# Patient Record
Sex: FEMALE | Race: WHITE
[De-identification: names, ages, dates, MRNs, and addresses within clinical notes are randomized per-mention and may not be internally consistent; named-entity substitution may affect disease eponyms.]

---

## 2020-11-06 ENCOUNTER — HOSPITAL ENCOUNTER (OUTPATIENT)
Dept: HOSPITAL 61 - ECHO | Age: 40
End: 2020-11-06
Attending: INTERNAL MEDICINE
Payer: COMMERCIAL

## 2020-11-06 DIAGNOSIS — I34.0: Primary | ICD-10-CM

## 2020-11-06 PROCEDURE — 93306 TTE W/DOPPLER COMPLETE: CPT

## 2020-11-06 NOTE — CARD
MR#: [id number]

Account#: [id number]

Accession#: [id number]

Date of Study: 11/06/2020

Ordering Physician: ARETHA AUGUSTIN, 

Referring Physician: ARETHA AUGUSTIN, 

Tech: Brandie Clark [facility]





--------------- APPROVED REPORT --------------





EXAM: Two-dimensional and M-mode echocardiogram with Doppler and color Doppler.



Other Information 

Quality : Good



INDICATION

Dyspnea 



2D DIMENSIONS 

RVDd2.5 (2.9-3.5cm)Left Atrium(2D)3.4 (1.6-4.0cm)

IVSd0.8 (0.7-1.1cm)Aortic Root(2D)2.1 (2.0-3.7cm)

LVDd5.2 (3.9-5.9cm)LVOT Diameter2.0 (1.8-2.4cm)

PWd0.9 (0.7-1.1cm)LVDs3.8 (2.5-4.0cm)

FS (%) 27.3 %SV69.0 ml



Aortic Valve

AoV Peak Gene.133.8cm/sAoV VTI26.4cm

AO Peak GR.7.2mmHgLVOT Peak Gene.113.7cm/s

LVOT  VTI 19.98cmAO Mean GR.4mmHg

KAYDEN (VMAX)2.36rs7LSH   (VTI)2.45cm2



Mitral Valve

MV E Cxqpygox61.8cm/sMV DECEL [id number]

MV A Gqqtydxy92.9cm/sMV [id number]

E/A  Ratio1.6MVA (PHT)3.98cm2



TDI

E/Lateral E'18.8E/Medial E'9.1



Pulmonary Vein

S1 Tesyghix45.1cm/sD2 Atlzqdjo19.3cm/s



 LEFT VENTRICLE 

The left ventricle is normal size. There is normal left ventricular wall thickness. Left ventricle sy
stolic function is low normal. The Ejection Fraction is 50-55%. There is normal LV segmental wall mot
ion. The left ventricular diastolic function and filling is normal for age.



 RIGHT VENTRICLE 

The right ventricle is normal size. The right ventricular systolic function is normal.



 ATRIA 

The left atrium size is normal. The right atrium size is normal. The interatrial septum is intact wit
h no evidence for an atrial septal defect or patent foramen ovale as noted on 2-D or Doppler imaging.




 AORTIC VALVE 

The aortic valve is normal in structure and function. Doppler and Color Flow revealed no significant 
aortic regurgitation. There is no significant aortic valvular stenosis.



 MITRAL VALVE 

The mitral valve is normal in structure and function. There is no evidence of mitral valve prolapse. 
There is no mitral valve stenosis. Doppler and Color-flow revealed trace to mild mitral regurgitation
.



 TRICUSPID VALVE 

The tricuspid valve is normal in structure and function. Doppler and Color Flow revealed no tricuspid
 valve regurgitation noted. There is no tricuspid valve stenosis.



 PULMONIC VALVE 

The pulmonary valve is normal in structure and function. Doppler and Color Flow revealed trace pulmon
ic valvular regurgitation. There is no pulmonic valvular stenosis.



 GREAT VESSELS 

The aortic root is normal in size. The ascending aorta is normal in size. The IVC is normal in size a
nd collapses >50% with inspiration.



 PERICARDIAL EFFUSION 

There is no evidence of significant pericardial effusion.



Critical Notification

Critical Value: No



<Conclusion>

The left ventricle is normal size.

Left ventricle systolic function is low normal.

The Ejection Fraction is 50-55%.

Doppler and Color Flow revealed no significant aortic regurgitation.

There is no significant aortic valvular stenosis.

Doppler and Color-flow revealed trace to mild mitral regurgitation.

Doppler and Color Flow revealed no tricuspid valve regurgitation noted.



Signed by : Aretha Augustin MD

Electronically Approved : 11/06/2020 14:31:25

## 2020-12-20 ENCOUNTER — HOSPITAL ENCOUNTER (EMERGENCY)
Dept: HOSPITAL 63 - ER | Age: 40
Discharge: HOME | End: 2020-12-20
Payer: COMMERCIAL

## 2020-12-20 ENCOUNTER — HOSPITAL ENCOUNTER (EMERGENCY)
Dept: HOSPITAL 61 - ER | Age: 40
Discharge: HOME | End: 2020-12-20
Payer: COMMERCIAL

## 2020-12-20 VITALS
SYSTOLIC BLOOD PRESSURE: 102 MMHG | SYSTOLIC BLOOD PRESSURE: 102 MMHG | DIASTOLIC BLOOD PRESSURE: 71 MMHG | DIASTOLIC BLOOD PRESSURE: 71 MMHG

## 2020-12-20 VITALS — BODY MASS INDEX: 31.6 KG/M2 | HEIGHT: 63 IN | WEIGHT: 178.35 LBS

## 2020-12-20 VITALS — HEIGHT: 66 IN | WEIGHT: 170.2 LBS | BODY MASS INDEX: 27.35 KG/M2

## 2020-12-20 VITALS — DIASTOLIC BLOOD PRESSURE: 66 MMHG | SYSTOLIC BLOOD PRESSURE: 115 MMHG

## 2020-12-20 DIAGNOSIS — Z91.041: ICD-10-CM

## 2020-12-20 DIAGNOSIS — K21.9: ICD-10-CM

## 2020-12-20 DIAGNOSIS — Z91.040: ICD-10-CM

## 2020-12-20 DIAGNOSIS — Z87.891: ICD-10-CM
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
## 2020-12-20 DIAGNOSIS — Z98.890: ICD-10-CM

## 2020-12-20 DIAGNOSIS — Z88.1: ICD-10-CM

## 2020-12-20 DIAGNOSIS — M25.551: ICD-10-CM

## 2020-12-20 DIAGNOSIS — Z88.2: ICD-10-CM

## 2020-12-20 DIAGNOSIS — M54.5: Primary | ICD-10-CM
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
## 2020-12-20 DIAGNOSIS — S39.012A: Primary | ICD-10-CM
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
## 2020-12-20 PROCEDURE — 96372 THER/PROPH/DIAG INJ SC/IM: CPT

## 2020-12-20 PROCEDURE — 73502 X-RAY EXAM HIP UNI 2-3 VIEWS: CPT

## 2020-12-20 PROCEDURE — 72220 X-RAY EXAM SACRUM TAILBONE: CPT

## 2020-12-20 PROCEDURE — 99284 EMERGENCY DEPT VISIT MOD MDM: CPT

## 2020-12-20 NOTE — ED.ADGEN
Past Medical History


Past Medical History:  No Pertinent History


Past Surgical History:  No Surgical History


Smoking Status:  Former Smoker


Alcohol Use:  Rarely





General Adult


EDM:


Chief Complaint:  MECHANICAL FALL





HPI:


HPI:





Patient is a 40  year old female, accompanied by her , who presents 

emergency department with complaints of right-sided low back pain after a fall 

from standing today.  Patient reports she was seen at [facility] and had x-rays 

done.  They told her that there were no fractures.  While she was at the 

hospital they gave her some medicine and told her to take Tylenol and ibuprofen 

as needed for pain.  Patient states that the pain has become worse she denies 

any saddle anesthesia, loss of bowel or bladder control, or radiation of the 

pain.  She states that her low back hurts so bad she cannot get comfortable.  

She currently rates pain 10 out of 10 on the pain scale, she denies any 

numbness, tingling, or weakness of her lower extremities.  She states that she 

has been taking Tylenol every 6 hours at home with no relief of her pain.





Review of Systems:


Review of Systems:


Complete ROS is negative unless otherwise noted in HPI.





Current Medications:





Current Medications








 Medications


  (Trade)  Dose


 Ordered  Sig/Solitario  Start Time


 Stop Time Status Last Admin


Dose Admin


 


 Ketorolac


 Tromethamine


  (Toradol 30mg


 Vial)  30 mg  1X  ONCE  12/20/20 23:30


 12/20/20 23:31 Cancel  





 


 Ketorolac


 Tromethamine


  (Toradol Im)  60 mg  1X  ONCE  12/20/20 23:00


 12/20/20 23:03 DC 12/20/20 23:10


60 MG


 


 Morphine Sulfate


  (Morphine


 Sulfate)  5 mg  1X  ONCE  12/20/20 23:30


 12/20/20 23:31 DC 12/20/20 23:10


5 MG


 


 Orphenadrine


 Citrate


  (Norflex)  60 mg  1X  ONCE  12/20/20 23:30


 12/20/20 23:31 DC 12/20/20 23:09


60 MG











Allergies:


Allergies:





Allergies








Coded Allergies Type Severity Reaction Last Updated Verified


 


  Iodine and Iodide Containing Produc Allergy Severe  12/20/20 Yes


 


  Sulfa (Sulfonamide Antibiotics) Allergy Intermediate  12/20/20 Yes


 


  amoxicillin Allergy Intermediate  12/20/20 Yes











Physical Exam:


PE:


See Above


Constitutional: Well developed, well nourished, no acute distress, non-toxic 

appearance. []


HENT: Normocephalic, atraumatic, bilateral external ears normal, nose normal. []


Eyes: PERRLA, EOMI, conjunctiva normal, no discharge. [] 


Neck: Normal range of motion, no stridor. [] 


Cardiovascular:Heart rate regular rhythm


Lungs & Thorax:  Respirations even and unlabored, no retractions, no respiratory

 distress


Back: No bony tenderness, right lumbar paraspinal tenderness to palpation, spasm

 noted, negative straight leg lift


Skin: Warm, dry, no erythema, no rash. [] 


Extremities: No cyanosis, ROM intact, no edema. [] 


Neurologic: Alert and oriented X 3, no focal deficits noted. []


Psychologic: Affect normal, judgement normal, mood normal. []





Current Patient Data:


Vital Signs:





                                   Vital Signs








  Date Time  Temp Pulse Resp B/P (MAP) Pulse Ox O2 Delivery O2 Flow Rate FiO2


 


12/20/20 23:10     98 Room Air  


 


12/20/20 22:25 97.9 77 18 102/71 (81)    





 97.9       











EKG:


EKG:


[]





Heart Score:


Risk Factors:


Risk Factors:  DM, Current or recent (<one month) smoker, HTN, HLP, family 

history of CAD, obesity.


Risk Scores:


Score 0 - 3:  2.5% MACE over next 6 weeks - Discharge Home


Score 4 - 6:  20.3% MACE over next 6 weeks - Admit for Clinical Observation


Score 7 - 10:  72.7% MACE over next 6 weeks - Early Invasive Strategies





Radiology/Procedures:


Radiology/Procedures:


[]





Course & Med Decision Making:


Course & Med Decision Making


Pertinent Labs and Imaging studies reviewed. (See chart for details)





40-year-old female presents emergency department with complaints of right low 

back pain after a fall from standing this morning.


Patient was given 60 mg of IM Toradol, 60 mg of IM Norflex, and 5 mg of IM 

morphine.


She reported some benefit from these medications prior to discharge.


Prescriptions written for naproxen and orphenadrine.  Patient was encouraged to 

apply ice to sore areas for the first 48 hours then apply heat or ice as needed 

for comfort.  Activity as tolerated.  Recommend follow-up with primary care 

doctor this week if symptoms persist, return to ER symptoms worsen.





Patient verbalized an understanding of home care, medications, follow-up, and 

return to ED instructions and was in agreement with the plan of care.


[]





Dragon Disclaimer:


Dragon Disclaimer:


This electronic medical record was generated, in whole or in part, using a voice

 recognition dictation system.





Departure


Departure


Impression:  


   Primary Impression:  


   Strain of lumbar paraspinal muscle


   Additional Impression:  


   Acute low back pain without sciatica


Disposition:  01 DC HOME SELF CARE/HOMELESS


Condition:  STABLE


Referrals:  


MAGUI THOMPSON (PCP)


Patient Instructions:  Low Back Strain with Rehab[facility]





Additional Instructions:  


Fill the prescription(s) and use as directed. Apply ice to sore areas for 10 to 

15 minutes every hour while awake to the night and tomorrow then apply ice or 

heat as needed for comfort. Activity as tolerated. Follow up with your primary 

care doctor this week if symptoms persist, return to the ER if symptoms worsen.


Scripts


Naproxen (NAPROXEN) 500 Mg Tablet


1 TAB PO BID PRN for PAIN for 10 Days, #20 TAB 0 Refills


   Prov: AMY AVALOS APRN         12/20/20 


Orphenadrine Citrate (ORPHENADRINE CITRATE) 100 Mg Tablet.er


1 TAB PO BID PRN for PAIN for 10 Days, #20 TAB 0 Refills


   Prov: AMY AVALOS APRN         12/20/20





Problem Qualifiers








   Primary Impression:  


   Strain of lumbar paraspinal muscle


   Encounter type:  initial encounter  Qualified Codes:  S39.012A - Strain of 

   muscle, fascia and tendon of lower back, initial encounter


   Additional Impression:  


   Acute low back pain without sciatica


   Back pain laterality:  right  Qualified Codes:  M54.5 - Low back pain








AMY AVALOS APRN       Dec 20, 2020 23:28

## 2020-12-20 NOTE — PHYS DOC
Past History


Past Medical History:  GERD


Past Surgical History:  Other


Additional Past Surgical Histo:  SPINAL FUSION C5-C6 LAST MARCH; LEG


Alcohol Use:  Occasionally





Adult General


Chief Complaint


Chief Complaint:  BACK PAIN OR INJURY





HPI


HPI





Patient is 40-year-old female presenting for back pain.  States she suffered a 

mechanical fall after tripping over her household animal toy, fell backwards 

landing on her right hip and subsequently fell on outstretched right hand.  

Patient reports focal right hip and lower back pain since onset with difficulty 

walking but admits she is ambulating.  Patient took 800 mg ibuprofen prior to 

event no other medications for pain today.  Outside of recent mechanical fall, 

only known spinal issue is recent cervical fusion surgery.  No other concerning 

red flag symptoms of back pain such as pain greater than 6 weeks, age, IV drug 

use history, history of cancer, neurologic symptoms such as paresthesias, saddle

anesthesia and/or weakness, no changes in bladder or bowel function or 

incontinence, no anticoagulant use or known coagulopathies.





Review of Systems


Review of Systems


Fourteen body systems of review of systems have been reviewed. See HPI for 

pertinent positives and negative responses, other wise all other systems are 

negative, non-pertinent or non-contributory





Current Medications


Current Medications





Current Medications








 Medications


  (Trade)  Dose


 Ordered  Sig/Micheal  Start Time


 Stop Time Status Last Admin


Dose Admin


 


 Acetaminophen


  (Tylenol)  1,000 mg  1X  ONCE  12/20/20 10:00


 12/20/20 10:01 UNV  





 


 Cyclobenzaprine


 HCl


  (Flexeril)  10 mg  1X  ONCE  12/20/20 10:00


 12/20/20 10:01 UNV  














Allergies


Allergies





Allergies








Coded Allergies Type Severity Reaction Last Updated Verified


 


  Iodinated Contrast Media Allergy Intermediate  12/20/20 Yes


 


  Sulfa (Sulfonamide Antibiotics) Allergy Unknown  12/20/20 Yes


 


  amoxicillin Allergy Unknown  12/20/20 Yes











Physical Exam


Physical Exam


Constitutional: 


Pt is oriented to person, place, and time. Pt appears well-developed and well-

nourished.





HEENT:


Head: Normocephalic and atraumatic.


External ears unremarkable, negative knight sign


Conjunctivae and EOM are normal. Pupils are equal, round, and reactive to light.


Oropharynx is clear and moist.


No hematomas or lacerations or abrasions to face or scalp


OP clear, no blood, no malocclusion, dentition intact


Nares clear, no nasal septal hematoma


Midface stable





Neck:


C-spine midline nontender, no step-offs





Cardiovascular: 


Normal rate, regular rhythm and normal heart sounds.





Pulmonary/Chest: 


Effort normal and breath sounds normal. No respiratory distress. No wheezes. CTA

bilaterally





Abdominal: 


Soft. Bowel sounds are normal. Pt exhibits no distension. There is no 

tenderness.





Musculoskeletal: 


No bony tenderness to extremities, no deformities, full ROM extremities


Chest wall stable


Pelvis stable and tender to palpation of right hip without any focal 

abnormalities apparent


Straight leg raise positive on right.  Patient pointing to medial portion of 

right gluteal muscle as focal portion of pain with mild radiation into right 

posterior thigh and right upper back


No vertebral TTP and spine without stepoffs





Neurological: 


Pt is alert and oriented to person, place, and time.


Moving all extremities willfully, able to wiggle all fingers and toes


Alert and oriented x 3


Sensation grossly intact





Skin: 


Skin is warm and dry. No abrasions, no lacerations





Psychiatric: 


Behavior is appropriate for situation





Current Patient Data


Vital Signs





                                   Vital Signs








  Date Time  Temp Pulse Resp B/P (MAP) Pulse Ox O2 Delivery O2 Flow Rate FiO2


 


12/20/20 09:48 98.0 91 24 114/87 (96) 98 Room Air  











EKG


EKG


[]





Radiology/Procedures


Radiology/Procedures





EXAM:  XR SACRUM AND COCCYX 2+VIEWS, XR BILATERAL HIP (WITH OR WITHOUT PELVIS) 2

 VIEWS_RIGHT 12/20/2020 10:03 AM





CLINICAL INDICATION:  Fall today, severe right hip and lower back pain, pelvic 

pain





COMPARISON:  None





TECHNIQUE:  3 views of the sacrum, one view of the pelvis and 2 views of the 

right hip





FINDINGS:  





Sacrum: No acute fracture. Sacral iliac joints are normal. Visualized portion of

 the lower lumbar spine is unremarkable.





Right hip: No acute fracture. Hip joint spaces are maintained. Pubic symphysis 

is unremarkable. There are few calcifications in the right abductor tendons at 

the greater trochanter.





IMPRESSION:  


1. No acute fracture of the sacrum.


2. No acute fracture of the right hip or pelvis.


3. A few calcifications in the right abductor tendons.





Electronically signed by: Kim Dove MD (12/20/2020 10:30 AM) [id number]





Heart Score


HEART Score for Chest Pain:  








HEART Score for Chest Pain Response (Comments) Value


 


History Slighlty/Non-Suspicious 0


 


Age < 45 0


 


Risk Factors                            1 or 2 Risk Factors 1


 


Total  1








Risk Factors:


Risk Factors:  DM, Current or recent (<one month) smoker, HTN, HLP, family 

history of CAD, obesity.


Risk Scores:


Risk Factors:  DM, Current or recent (<one month) smoker, HTN, HLP, family 

history of CAD, obesity.





Course & Med Decision Making


Course & Med Decision Making


Pertinent Labs and Imaging studies reviewed. (See chart for details)





Discussed no bony abnormalities. Most likely dx self-limiting msk insult such as

 sciatica vs lower lumbar strain


Exercises, NSAIDs/tylenol and other supportive care practices advised with close

 PCP follow-up and consideration for PT referral


SRP disussed with good understanding by patient, all questions and concerns 

addressed prior to departure in well-appearing, ambulatory patient without red 

flag signs of back pain





Dragon Disclaimer


Dragon Disclaimer


This electronic medical record was generated, in whole or in part, using a voice

 recognition dictation system.





Departure


Departure:


Impression:  


   Primary Impression:  


   Lower back pain


Disposition:  01 DC HOME SELF CARE/HOMELESS


Condition:  STABLE


Referrals:  


KYLER HENDRIX (PCP)


Patient Instructions:  Back Pain, Adult, Low Back Strain with Rehab-[facility]





Additional Instructions:  


You were evaluated in the Emergency Department today for back pain. Your 

evaluation suggests no acute abnormalities which require further intervention at

 this time.  Your pain is most likely due to to a musculoskeletal cause that 

should improve with supportive care.





- Move around as tolerated but avoiding heavy lifting. ``Bed rest is not 

recommended nor is it the best treatment for low back pain.


- Medications will help control your discomfort:


- -Ibuprofen (800 mg every 8 hours for pain) with food.


- -Tylenol


- Do not drink alcohol, drive a car, operate machinery, or get up on ladders or 

heights when taking any prescribed pain medications.


- Do not drive home if you received prescribed pain medications here in the ED.





Return to the ED immediately if you develop any of the following problems:


- Leaking urine or difficulty urinating;


- Inability to control your bowels;


- New numbness or weakness in your legs or numbness between your legs;


- Inability to walk


- Fever











CHIQUI LAMA DO                 Dec 20, 2020 10:05

## 2020-12-20 NOTE — RAD
EXAM:  XR SACRUM AND COCCYX 2+VIEWS, XR BILATERAL HIP (WITH OR WITHOUT PELVIS) 2 VIEWS_RIGHT 12/20/20
20 10:03 AM



CLINICAL INDICATION:  Fall today, severe right hip and lower back pain, pelvic pain



COMPARISON:  None



TECHNIQUE:  3 views of the sacrum, one view of the pelvis and 2 views of the right hip



FINDINGS:  



Sacrum: No acute fracture. Sacral iliac joints are normal. Visualized portion of the lower lumbar spi
ne is unremarkable.



Right hip: No acute fracture. Hip joint spaces are maintained. Pubic symphysis is unremarkable. There
 are few calcifications in the right abductor tendons at the greater trochanter.



IMPRESSION:  

1. No acute fracture of the sacrum.

2. No acute fracture of the right hip or pelvis.

3. A few calcifications in the right abductor tendons.



Electronically signed by: Kim Dove MD (12/20/2020 10:30 AM) [id number]

## 2022-02-21 ENCOUNTER — HOSPITAL ENCOUNTER (OUTPATIENT)
Dept: HOSPITAL 61 - LAB | Age: 42
End: 2022-02-21
Attending: NURSE PRACTITIONER
Payer: COMMERCIAL

## 2022-02-21 DIAGNOSIS — R53.1: ICD-10-CM

## 2022-02-21 DIAGNOSIS — R93.89: Primary | ICD-10-CM

## 2022-02-21 PROCEDURE — 83519 RIA NONANTIBODY: CPT

## 2022-02-21 PROCEDURE — 82746 ASSAY OF FOLIC ACID SERUM: CPT

## 2022-02-21 PROCEDURE — 86141 C-REACTIVE PROTEIN HS: CPT

## 2022-02-21 PROCEDURE — 36415 COLL VENOUS BLD VENIPUNCTURE: CPT

## 2022-02-21 PROCEDURE — 86255 FLUORESCENT ANTIBODY SCREEN: CPT

## 2022-02-21 PROCEDURE — 82607 VITAMIN B-12: CPT

## 2022-02-21 PROCEDURE — 86038 ANTINUCLEAR ANTIBODIES: CPT

## 2022-02-21 PROCEDURE — 84443 ASSAY THYROID STIM HORMONE: CPT

## 2022-02-21 PROCEDURE — 84165 PROTEIN E-PHORESIS SERUM: CPT

## 2022-03-09 ENCOUNTER — HOSPITAL ENCOUNTER (OUTPATIENT)
Dept: HOSPITAL 61 - RAD | Age: 42
Discharge: HOME | End: 2022-03-09
Attending: NURSE PRACTITIONER
Payer: COMMERCIAL

## 2022-03-09 VITALS — SYSTOLIC BLOOD PRESSURE: 99 MMHG | DIASTOLIC BLOOD PRESSURE: 57 MMHG

## 2022-03-09 VITALS — DIASTOLIC BLOOD PRESSURE: 57 MMHG | SYSTOLIC BLOOD PRESSURE: 95 MMHG

## 2022-03-09 VITALS — SYSTOLIC BLOOD PRESSURE: 96 MMHG | DIASTOLIC BLOOD PRESSURE: 64 MMHG

## 2022-03-09 VITALS — DIASTOLIC BLOOD PRESSURE: 60 MMHG | SYSTOLIC BLOOD PRESSURE: 92 MMHG

## 2022-03-09 DIAGNOSIS — Z88.2: ICD-10-CM

## 2022-03-09 DIAGNOSIS — Z98.890: ICD-10-CM

## 2022-03-09 DIAGNOSIS — Z88.1: ICD-10-CM

## 2022-03-09 DIAGNOSIS — Z91.041: ICD-10-CM

## 2022-03-09 DIAGNOSIS — R53.1: Primary | ICD-10-CM

## 2022-03-09 DIAGNOSIS — Z87.891: ICD-10-CM

## 2022-03-09 DIAGNOSIS — Z79.899: ICD-10-CM

## 2022-03-09 DIAGNOSIS — Z79.82: ICD-10-CM

## 2022-03-09 DIAGNOSIS — Z72.89: ICD-10-CM

## 2022-03-09 LAB
CLARITY CSF: CLEAR
COLOR CSF: COLORLESS
CSF RBC COUNT: 0 /CMM
CSF WBC COUNT: 0 /CMM
GLUCOSE CSF-MCNC: 57 MG/DL (ref 37–70)
PROT CSF-MCNC: 35.2 MG/DL (ref 15–45)

## 2022-03-09 PROCEDURE — 82945 GLUCOSE OTHER FLUID: CPT

## 2022-03-09 PROCEDURE — 62270 DX LMBR SPI PNXR: CPT

## 2022-03-09 PROCEDURE — 87102 FUNGUS ISOLATION CULTURE: CPT

## 2022-03-09 PROCEDURE — 62328 DX LMBR SPI PNXR W/FLUOR/CT: CPT

## 2022-03-09 PROCEDURE — 87252 VIRUS INOCULATION TISSUE: CPT

## 2022-03-09 PROCEDURE — 83916 OLIGOCLONAL BANDS: CPT

## 2022-03-09 PROCEDURE — 84157 ASSAY OF PROTEIN OTHER: CPT

## 2022-03-09 PROCEDURE — 36415 COLL VENOUS BLD VENIPUNCTURE: CPT

## 2022-03-09 PROCEDURE — 89051 BODY FLUID CELL COUNT: CPT

## 2022-03-09 PROCEDURE — 82787 IGG 1 2 3 OR 4 EACH: CPT

## 2022-03-09 PROCEDURE — 87075 CULTR BACTERIA EXCEPT BLOOD: CPT

## 2022-03-09 NOTE — NUR
Discharge Note:



KLAUDIA LEONARD



Discharge instructions and discharge home medications reviewed with Patient and a copy 
given. All questions have been answered and understanding verbalized. 



The following instructions and handouts were given: lumbar puncture



Dressing to back clean and dry



Patient discharged to Home or Self Care with Family Member via Wheelchair DIANA BAKER


-------------------------------------------------------------------------------

Addendum: 03/09/22 at 0957 by REMY SALAZAR RN

-------------------------------------------------------------------------------

Amended: Links added.

## 2022-03-09 NOTE — RAD
FLUOROSCOPICALLY GUIDED LUMBAR PUNCTURE FOR CSF SAMPLING:



Clinical History: Abnormal MRI, left-sided weakness.



Procedure:  The relative benefits, risks and alternatives to the procedure were discussed and verbal 
and written informed consent was obtained.



The patient was placed prone and slightly oblique on the fluoroscopic table and bony landmarks were u
sed to plan for a lumbar puncture.  The patient was carefully prepped and draped in a sterile fashion
 and with local anesthetic and sterile technique, a 22-gauge spinal needle was advanced at the L3 lev
el.  Clear CSF was seen and approximately 12 cc of clear fluid were aspirated and sent for testing. N
eedle was removed with stylet in place. Hemostasis achieved.  

The procedure was well tolerated and the patient was sent to Recovery in good condition. 



Total fluoroscopy time for the procedure was 0.4 minutes. 0 fluoroscopic spot images.



IMPRESSION:

Fluoroscopic guided lumbar puncture. 



Electronically signed by: Rudi Nath MD (3/9/2022 10:04 AM) [id number]

## 2022-03-11 ENCOUNTER — HOSPITAL ENCOUNTER (OUTPATIENT)
Dept: HOSPITAL 61 - ER | Age: 42
Setting detail: OBSERVATION
LOS: 1 days | Discharge: HOME | End: 2022-03-12
Attending: INTERNAL MEDICINE | Admitting: INTERNAL MEDICINE
Payer: COMMERCIAL

## 2022-03-11 VITALS — BODY MASS INDEX: 30.51 KG/M2 | WEIGHT: 172.18 LBS | HEIGHT: 63 IN
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
## 2022-03-11 DIAGNOSIS — Z79.899: ICD-10-CM

## 2022-03-11 DIAGNOSIS — G97.1: ICD-10-CM

## 2022-03-11 DIAGNOSIS — R51.9: Primary | ICD-10-CM

## 2022-03-11 DIAGNOSIS — Z79.82: ICD-10-CM

## 2022-03-11 DIAGNOSIS — Z98.890: ICD-10-CM

## 2022-03-11 DIAGNOSIS — Z86.73: ICD-10-CM

## 2022-03-11 DIAGNOSIS — Z87.891: ICD-10-CM

## 2022-03-11 LAB
ALBUMIN SERPL-MCNC: 3.4 G/DL (ref 3.4–5)
ALBUMIN/GLOB SERPL: 0.9 {RATIO} (ref 1–1.7)
ALP SERPL-CCNC: 101 U/L (ref 46–116)
ALT SERPL-CCNC: 35 U/L (ref 14–59)
ANION GAP SERPL CALC-SCNC: 10 MMOL/L (ref 6–14)
AST SERPL-CCNC: 14 U/L (ref 15–37)
BASOPHILS # BLD AUTO: 0.1 X10^3/UL (ref 0–0.2)
BASOPHILS NFR BLD: 1 % (ref 0–3)
BILIRUB SERPL-MCNC: 0.1 MG/DL (ref 0.2–1)
BUN SERPL-MCNC: 14 MG/DL (ref 7–20)
BUN/CREAT SERPL: 16 (ref 6–20)
CALCIUM SERPL-MCNC: 8.5 MG/DL (ref 8.5–10.1)
CHLORIDE SERPL-SCNC: 105 MMOL/L (ref 98–107)
CO2 SERPL-SCNC: 25 MMOL/L (ref 21–32)
CREAT SERPL-MCNC: 0.9 MG/DL (ref 0.6–1)
EOSINOPHIL NFR BLD: 0.1 X10^3/UL (ref 0–0.7)
EOSINOPHIL NFR BLD: 1 % (ref 0–3)
ERYTHROCYTE [DISTWIDTH] IN BLOOD BY AUTOMATED COUNT: 13.1 % (ref 11.5–14.5)
GFR SERPLBLD BASED ON 1.73 SQ M-ARVRAT: 68.3 ML/MIN
GLUCOSE SERPL-MCNC: 103 MG/DL (ref 70–99)
HCT VFR BLD CALC: 37.6 % (ref 36–47)
HGB BLD-MCNC: 12.6 G/DL (ref 12–15.5)
LYMPHOCYTES # BLD: 1.4 X10^3/UL (ref 1–4.8)
LYMPHOCYTES NFR BLD AUTO: 14 % (ref 24–48)
MCH RBC QN AUTO: 30 PG (ref 25–35)
MCHC RBC AUTO-ENTMCNC: 34 G/DL (ref 31–37)
MCV RBC AUTO: 89 FL (ref 79–100)
MONO #: 0.6 X10^3/UL (ref 0–1.1)
MONOCYTES NFR BLD: 6 % (ref 0–9)
NEUT #: 7.5 X10^3/UL (ref 1.8–7.7)
NEUTROPHILS NFR BLD AUTO: 78 % (ref 31–73)
PLATELET # BLD AUTO: 219 X10^3/UL (ref 140–400)
POTASSIUM SERPL-SCNC: 3.9 MMOL/L (ref 3.5–5.1)
PROT SERPL-MCNC: 7.2 G/DL (ref 6.4–8.2)
RBC # BLD AUTO: 4.21 X10^6/UL (ref 3.5–5.4)
SODIUM SERPL-SCNC: 140 MMOL/L (ref 136–145)
WBC # BLD AUTO: 9.7 X10^3/UL (ref 4–11)

## 2022-03-11 PROCEDURE — 36415 COLL VENOUS BLD VENIPUNCTURE: CPT

## 2022-03-11 PROCEDURE — 96366 THER/PROPH/DIAG IV INF ADDON: CPT

## 2022-03-11 PROCEDURE — 99284 EMERGENCY DEPT VISIT MOD MDM: CPT

## 2022-03-11 PROCEDURE — 96361 HYDRATE IV INFUSION ADD-ON: CPT

## 2022-03-11 PROCEDURE — 80053 COMPREHEN METABOLIC PANEL: CPT

## 2022-03-11 PROCEDURE — 85025 COMPLETE CBC W/AUTO DIFF WBC: CPT

## 2022-03-11 PROCEDURE — 96376 TX/PRO/DX INJ SAME DRUG ADON: CPT

## 2022-03-11 PROCEDURE — G0379 DIRECT REFER HOSPITAL OBSERV: HCPCS

## 2022-03-11 PROCEDURE — 96365 THER/PROPH/DIAG IV INF INIT: CPT

## 2022-03-11 PROCEDURE — 70450 CT HEAD/BRAIN W/O DYE: CPT

## 2022-03-11 PROCEDURE — 96375 TX/PRO/DX INJ NEW DRUG ADDON: CPT

## 2022-03-11 PROCEDURE — G0378 HOSPITAL OBSERVATION PER HR: HCPCS

## 2022-03-11 NOTE — ED.ADGEN
Past Medical History


Past Medical History:  No Pertinent History


Past Surgical History:  Other


Additional Past Surgical Histo:  bilat leg fasciotmy


Smoking Status:  Former Smoker


Alcohol Use:  Rarely





General Adult


EDM:


Chief Complaint:  HEADACHE





HPI:


HPI:





Patient is a 41  year old female coming in for headaches to worsen with the past

2 days.  Patient had an LP done here 2 days ago to rule out MS.  Patient 

recently had a stroke and has some ataxia deficits which she says are "99% 

improved".  Patient says she has a headache that is around the top of her head. 

Has had some photophobia, denies any vision changes nausea or vomiting.  Patient

denies any history of headaches.  States that headache is better with laying 

down and worse with sitting up.  Denies any fever chills, neck rigidity, hearing

loss or tinnitus





Review of Systems:


Review of Systems:


All other systems within normal limits except for as noted in the HPI





Current Medications:





Current Medications








 Medications


  (Trade)  Dose


 Ordered  Sig/Solitario  Start Time


 Stop Time Status Last Admin


Dose Admin


 


 Caffeine Citrated


  (Caffeine


 Citrate)  500 mg  1X  ONCE  3/11/22 21:00


 3/11/22 21:03 DC  





 


 Caffeine Citrated


 60 mg/Dextrose  103 ml @ 


 206 mls/hr  1X  ONCE  3/11/22 21:30


 3/11/22 21:59 DC 3/11/22 21:33


206 MLS/HR


 


 Fentanyl Citrate


  (Fentanyl 2ml


 Vial)  75 mcg  1X  ONCE  3/11/22 21:30


 3/11/22 21:31 DC 3/11/22 22:12


75 MCG


 


 Sodium Chloride  1,000 ml @ 


 1,000 mls/hr  1X  ONCE  3/11/22 21:00


 3/11/22 21:59 DC 3/11/22 22:14


1,000 MLS/HR











Allergies:


Allergies:





Allergies








Coded Allergies Type Severity Reaction Last Updated Verified


 


  Iodinated Contrast Media Allergy Severe  3/11/22 Yes


 


  Sulfa (Sulfonamide Antibiotics) Allergy Intermediate  20 Yes


 


  amoxicillin Allergy Intermediate  20 Yes











Physical Exam:


PE:


Constitutional: Well developed, well nourished, no acute distress, non-toxic 

appearance. []


HENT: Normocephalic, atraumatic, bilateral external ears normal,  nose normal. 

[]


Eyes: PERRLA, conjunctiva normal, no discharge. [Extraocular intact] 


Neck: No rigidity, supple, no stridor. [] 


Cardiovascular: Regular rate and rhythm, brisk cap refill []


Lungs & Thorax: Non labored symmetric respirations, no tachypnea or respiratory 

distress []


Abdomen: Soft, nondistended.


Skin: Warm, dry, no erythema, no rash. [] 


Back: Unremarkable


Extremities: No deformities, range of motion grossly intact, no lower extremity 

edema [] 


Neurologic: Alert and oriented X 3, no focal deficits noted. []


Psychologic: Affect normal, judgement normal, mood normal. []





Current Patient Data:


Labs:





                                Laboratory Tests








Test


 3/11/22


21:20


 


White Blood Count


 9.7 x10^3/uL


(4.0-11.0)


 


Red Blood Count


 4.21 x10^6/uL


(3.50-5.40)


 


Hemoglobin


 12.6 g/dL


(12.0-15.5)


 


Hematocrit


 37.6 %


(36.0-47.0)


 


Mean Corpuscular Volume


 89 fL ()





 


Mean Corpuscular Hemoglobin 30 pg (25-35)  


 


Mean Corpuscular Hemoglobin


Concent 34 g/dL


(31-37)


 


Red Cell Distribution Width


 13.1 %


(11.5-14.5)


 


Platelet Count


 219 x10^3/uL


(140-400)


 


Neutrophils (%) (Auto) 78 % (31-73)  H


 


Lymphocytes (%) (Auto) 14 % (24-48)  L


 


Monocytes (%) (Auto) 6 % (0-9)  


 


Eosinophils (%) (Auto) 1 % (0-3)  


 


Basophils (%) (Auto) 1 % (0-3)  


 


Neutrophils # (Auto)


 7.5 x10^3/uL


(1.8-7.7)


 


Lymphocytes # (Auto)


 1.4 x10^3/uL


(1.0-4.8)


 


Monocytes # (Auto)


 0.6 x10^3/uL


(0.0-1.1)


 


Eosinophils # (Auto)


 0.1 x10^3/uL


(0.0-0.7)


 


Basophils # (Auto)


 0.1 x10^3/uL


(0.0-0.2)


 


Sodium Level


 140 mmol/L


(136-145)


 


Potassium Level


 3.9 mmol/L


(3.5-5.1)


 


Chloride Level


 105 mmol/L


()


 


Carbon Dioxide Level


 25 mmol/L


(21-32)


 


Anion Gap 10 (6-14)  


 


Blood Urea Nitrogen


 14 mg/dL


(7-20)


 


Creatinine


 0.9 mg/dL


(0.6-1.0)


 


Estimated GFR


(Cockcroft-Gault) 68.3  





 


BUN/Creatinine Ratio 16 (6-20)  


 


Glucose Level


 103 mg/dL


(70-99)  H


 


Calcium Level


 8.5 mg/dL


(8.5-10.1)


 


Total Bilirubin


 0.1 mg/dL


(0.2-1.0)  L


 


Aspartate Amino Transferase


(AST) 14 U/L (15-37)


L


 


Alanine Aminotransferase (ALT)


 35 U/L (14-59)





 


Alkaline Phosphatase


 101 U/L


()


 


Total Protein


 7.2 g/dL


(6.4-8.2)


 


Albumin


 3.4 g/dL


(3.4-5.0)


 


Albumin/Globulin Ratio


 0.9 (1.0-1.7)


L





                                Laboratory Tests


3/11/22 21:20








                                Laboratory Tests


3/11/22 21:20








Vital Signs:





                                   Vital Signs








  Date Time  Temp Pulse Resp B/P (MAP) Pulse Ox O2 Delivery O2 Flow Rate FiO2


 


3/11/22 22:25  64 20  99   


 


3/11/22 22:12      Room Air  


 


3/11/22 20:23 97.9   108/72 (84)    





 97.9       











EKG:


EKG:


[]





Heart Score:


C/O Chest Pain:  No


Risk Factors:


Risk Factors:  DM, Current or recent (<one month) smoker, HTN, HLP, family 

history of CAD, obesity.


Risk Scores:


Score 0 - 3:  2.5% MACE over next 6 weeks - Discharge Home


Score 4 - 6:  20.3% MACE over next 6 weeks - Admit for Clinical Observation


Score 7 - 10:  72.7% MACE over next 6 weeks - Early Invasive Strategies





Radiology/Procedures:


Radiology/Procedures:


[facility]


                    [address] [place] [address] [phone]


                                        


                                 IMAGING REPORT





                                     Signed





PATIENT: KLAUDIA LEONARD  ACCOUNT: [id number]     MRN#: [id number]


: 1980           LOCATION: ER              AGE: 41


SEX: F                    EXAM DT: 22         ACCESSION#: [id number]


STATUS: REG ER            ORD. PHYSICIAN: EDITH HERNANDEZ MD


REASON: headache


PROCEDURE: CT HEAD WO CONTRAST





CT HEAD/BRAIN WO





History: Reason: headache / Spl. Instructions:  / History: 





Comparison: None.





Technique: Noncontrast CT imaging was performed of the head.  





Exposure: One or more of the following individualized dose reduction techniques 

were utilized for this examination:  


1. Automated exposure control  


2. Adjustment of the mA and/or kV according to patient size  


3. Use of iterative reconstruction technique.





Findings: 


No intracranial hemorrhage. No mass effect. No hydrocephalus.  Extra-axial 

spaces are unremarkable.





Imaged orbits are unremarkable. Imaged paranasal sinuses and mastoid air cells 

are clear. No acute calvarial fracture.





Impression:


1.  No acute intracranial abnormality. 





Electronically signed by: Garrison Guthrie DO (3/11/2022 11:50 PM) [facility]














DICTATED and SIGNED BY:     GARRISON GUTHRIE DO


DATE:     22 [id number]


[]





Course & Med Decision Making:


Course & Med Decision Making


Pertinent Labs and Imaging studies reviewed. (See chart for details)


Patient feeling better but still having worsening headache when standing up.  

Discussed with CRNA on-call, recommends having patient lay supine and they will 

see the patient with anesthesia in the morning to evaluate whether a blood patch

 is necessary.


[]





Dragon Disclaimer:


Dragon Disclaimer:


This electronic medical record was generated, in whole or in part, using a voice

 recognition dictation system.





Departure


Departure


Impression:  


   Primary Impression:  


   Post lumbar puncture headache


Disposition:   ADMITTED AS INPATIENT


Admitting Physician:  HIMS


Condition:  STABLE


Referrals:  


MADONNA LANGLEY (PCP)











EDITH HERNANDEZ MD            Mar 11, 2022 20:57

## 2022-03-11 NOTE — RAD
CT HEAD/BRAIN WO



History: Reason: headache / Spl. Instructions:  / History: 



Comparison: None.



Technique: Noncontrast CT imaging was performed of the head.  



Exposure: One or more of the following individualized dose reduction techniques were utilized for thi
s examination:  

1. Automated exposure control  

2. Adjustment of the mA and/or kV according to patient size  

3. Use of iterative reconstruction technique.



Findings: 

No intracranial hemorrhage. No mass effect. No hydrocephalus.  Extra-axial spaces are unremarkable.



Imaged orbits are unremarkable. Imaged paranasal sinuses and mastoid air cells are clear. No acute ca
lvarial fracture.



Impression:

1.  No acute intracranial abnormality. 



Electronically signed by: Garrison Guthrie DO (3/11/2022 11:50 PM) [facility]

## 2022-03-12 VITALS — SYSTOLIC BLOOD PRESSURE: 105 MMHG | DIASTOLIC BLOOD PRESSURE: 63 MMHG

## 2022-03-12 VITALS — SYSTOLIC BLOOD PRESSURE: 118 MMHG | DIASTOLIC BLOOD PRESSURE: 72 MMHG

## 2022-03-12 VITALS — SYSTOLIC BLOOD PRESSURE: 116 MMHG | DIASTOLIC BLOOD PRESSURE: 58 MMHG

## 2022-03-12 RX ADMIN — BACITRACIN SCH MLS/HR: 5000 INJECTION, POWDER, FOR SOLUTION INTRAMUSCULAR at 11:15

## 2022-03-12 RX ADMIN — BACITRACIN SCH MLS/HR: 5000 INJECTION, POWDER, FOR SOLUTION INTRAMUSCULAR at 03:10

## 2022-03-12 NOTE — PDOC
Provider Note


Date of Service:


DATE: 3/12/22 


TIME: 09:27


Provider Note


Anesthesia note:  Pt evaluated at the request of Dr. Nur for possible dural 

puncture headache.  Ms. Guerin had a diagnostic lumbar puncture on 3/9/22 and has 

been exhibiting symptoms consistent with a post dural puncture headache.  

Options for treatment, either conservative management or epidural blood patch 

were explained to her.  At this time, she has elected for conservative m

anagement of increased fluid intake, caffeine ingestion, and bedrest.  She was 

advised to contact her health care provider if symptoms don't improve over the 

the next 24-48 hours.





Justifications for Admission


Other Justification














PADMA MOONEY CRNA           Mar 12, 2022 09:39

## 2022-03-13 NOTE — PDOC3
[facility]-Discharge Summary


Date of Admission:


Date of Admission:  Mar 12, 2022





Date of Discharge:


Date of Discharge:  Mar 12, 2022





Admission Diagnosis:


Admitting Diagnosis:


Post lumbar puncture headache





Hospital Course:


Hospital Course:


Patient is a 41  year old female coming in for headaches to worsen with the past

2 days.  Patient had an LP done here 2 days ago to rule out MS.  Patient 

recently had a stroke and has some ataxia deficits which she says are "99% 

improved".  Patient says she has a headache that is around the top of her head. 

Has had some photophobia, denies any vision changes nausea or vomiting.  Patient

denies any history of headaches.  States that headache is better with laying 

down and worse with sitting up.  Denies any fever chills, neck rigidity, hearing

loss or tinnitus





Patient was offered blood patch from anesthesia and she declined opting for 

conservative measures.  When I evaluated her again she said headache improving. 

Appropriate for discharge today.  Instructions given for home care.  Greater 

than 30 minutes spent on discharge.





Disposition:


Disposition/Orders:  D/C to Home





Activity:


Activity:


Resume previous activity





Diet:


Diet:  Regular





Medications:


Home Meds


Active Scripts


Oxycodone Hcl (OXYCODONE HCL) 5 Mg Capsule, 5 MG PO PRN Q6HRS PRN for PAIN for 

10 Days, #20 TAB 0 Refills


   Prov:DU DURANT MD         3/12/22


Naproxen (NAPROXEN) 500 Mg Tablet, 1 TAB PO BID PRN for PAIN for 10 Days, #20 

TAB 0 Refills


   Prov:AMY AVALOS APRN         12/20/20


Orphenadrine Citrate (ORPHENADRINE CITRATE) 100 Mg Tablet.er, 1 TAB PO BID PRN 

for PAIN for 10 Days, #20 TAB 0 Refills


   Prov:AMY AVALOS APRN         12/20/20


Reported Medications


Atorvastatin Calcium (ATORVASTATIN CALCIUM) 40 Mg Tablet, 1 TAB PO DAILY for 

Cholesterol, #30 TAB 5 Refills


   3/12/22


Omeprazole (OMEPRAZOLE) 40 Mg Capsule.dr, 1 CAP PO DAILY for Heartburn, #30 CAP 

3 Refills


   3/12/22


Aspirin (ASPIRIN) 81 Mg Tab.chew, 1 TAB PO DAILY for Stroke , #30 TAB 3 Refills


   3/12/22


Scheduled


Aspirin (Aspirin), 1 TAB PO DAILY, (Reported)


Atorvastatin Calcium (Atorvastatin Calcium), 1 TAB PO DAILY, (Reported)


Omeprazole (Omeprazole), 1 CAP PO DAILY, (Reported)





Scheduled PRN


Naproxen (Naproxen), 1 TAB PO BID PRN for PAIN


Orphenadrine Citrate (Orphenadrine Citrate), 1 TAB PO BID PRN for PAIN


Oxycodone Hcl (Oxycodone Hcl), 5 MG PO PRN Q6HRS PRN for PAIN





Justicifation of Admission Dx:


Justifications for Admission:


Justification of Admission Dx:  Yes (post LP headache)











DU DURANT MD         Mar 13, 2022 17:47

## 2022-03-13 NOTE — PDOC1
History and Physical


Date of Service:


DOS:


March 12 late entry





Chief Complaint:


Chief Complain:


Headache after LP





History of Present Illness:


HPI:


Patient is a 41  year old female coming in for headaches to worsen with the past

2 days.  Patient had an LP done here 2 days ago to rule out MS.  Patient 

recently had a stroke and has some ataxia deficits which she says are "99% 

improved".  Patient says she has a headache that is around the top of her head. 

Has had some photophobia, denies any vision changes nausea or vomiting.  Patient

denies any history of headaches.  States that headache is better with laying 

down and worse with sitting up.  Denies any fever chills, neck rigidity, hearing

loss or tinnitus





Past Medical/Surgical History:


PMH/PSH:


Current work-up for MS.





Allergies:


Allergies:  


Coded Allergies:  


     Iodinated Contrast Media (Verified  Allergy, Severe, 3/11/22)


     Sulfa (Sulfonamide Antibiotics) (Verified  Allergy, Intermediate, 12/20/20)


     amoxicillin (Verified  Allergy, Intermediate, 12/20/20)





Family History:


Family History:


None known per patient





Social History:


Social History:


Denies alcohol tobacco drug use





Current Medications:


Current Medications





Current Medications


Fentanyl Citrate (Fentanyl 2ml Vial) 75 mcg 1X  ONCE IVP  Last administered on 

3/11/22at 22:12;  Start 3/11/22 at 21:30;  Stop 3/11/22 at 21:31;  Status DC


Caffeine Citrated (Caffeine Citrate) 500 mg 1X  ONCE IVP ;  Start 3/11/22 at 

21:00;  Stop 3/11/22 at 21:03;  Status DC


Sodium Chloride 1,000 ml @  1,000 mls/hr 1X  ONCE IV  Last administered on 

3/11/22at 22:14;  Start 3/11/22 at 21:00;  Stop 3/11/22 at 21:59;  Status DC


Caffeine Citrated 60 mg/Dextrose 103 ml @  206 mls/hr 1X  ONCE IVP  Last 

administered on 3/11/22at 21:33;  Start 3/11/22 at 21:30;  Stop 3/11/22 at 

21:59;  Status DC


Ondansetron HCl (Zofran) 4 mg PRN Q8HRS  PRN IVP NAUSEA/VOMITING 1ST CHOICE;  

Start 3/12/22 at 01:15;  Stop 3/12/22 at 09:35;  Status DC


Fentanyl Citrate (Fentanyl 2ml Vial) 50 mcg PRN Q1HR  PRN IVP SEVERE PAIN 7-10 

Last administered on 3/12/22at 03:10;  Start 3/12/22 at 01:15;  Stop 3/12/22 at 

09:35;  Status DC


Sodium Chloride 1,000 ml @  100 mls/hr Q10H IV  Last administered on 3/12/22at 

03:10;  Start 3/12/22 at 01:15;  Stop 3/12/22 at 13:11;  Status DC


Acetaminophen (Tylenol) 650 mg PRN Q4HRS  PRN PO FEVER > 100.3'F;  Start 3/12/22

at 01:15;  Stop 3/12/22 at 09:35;  Status DC


Atorvastatin Calcium (Lipitor) 40 mg QHS PO ;  Start 3/12/22 at 21:00;  Stop 

3/12/22 at 13:11;  Status DC


Pantoprazole Sodium (Protonix) 40 mg DAILYAC PO  Last administered on 3/12/22at 

11:02;  Start 3/12/22 at 11:00;  Stop 3/12/22 at 13:11;  Status DC


Ondansetron HCl (Zofran) 4 mg PRN Q6HRS  PRN IVP NAUSEA/VOMITING;  Start 3/12/22

at 09:45;  Stop 3/12/22 at 13:11;  Status DC


Calcium Carbonate/ Glycine (Tums) 500 mg PRN Q3HRS  PRN PO UPSET STOMACH;  Start

3/12/22 at 09:45;  Stop 3/12/22 at 13:11;  Status DC


Zolpidem Tartrate (Ambien) 5 mg PRN QHS  PRN PO INSOMNIA, MAY REPEAT IN 1HR;  

Start 3/12/22 at 09:45;  Stop 3/12/22 at 13:11;  Status DC


Info (Non-Icu Electrolyte Protocol) 1 ea PRN DAILY  PRN MC SEE COMMENTS;  Start 

3/12/22 at 09:45;  Stop 3/12/22 at 13:11;  Status DC


Morphine Sulfate (Morphine Sulfate) 1 mg PRN Q1HR  PRN IV MODERATE PAIN;  Start 

3/12/22 at 09:45;  Stop 3/12/22 at 13:11;  Status DC


Morphine Sulfate (Morphine Sulfate) 2 mg PRN Q1HR  PRN IV SEVERE PAIN;  Start 

3/12/22 at 09:45;  Stop 3/12/22 at 13:11;  Status DC


Acetaminophen (Tylenol) 650 mg PRN Q6HRS  PRN PO Headaches, Temp > 101.5F Last 

administered on 3/12/22at 11:23;  Start 3/12/22 at 09:45;  Stop 3/12/22 at 

13:11;  Status DC


Senna/Docusate Sodium (Senna Plus) 1 tab BID PO ;  Start 3/12/22 at 21:00;  Stop

3/12/22 at 13:11;  Status DC





Active Scripts


Active


Oxycodone Hcl 5 Mg Capsule 5 Mg PO PRN Q6HRS PRN 10 Days


Naproxen 500 Mg Tablet 1 Tab PO BID PRN 10 Days


Orphenadrine Citrate 100 Mg Tablet.er 1 Tab PO BID PRN 10 Days


Reported


Atorvastatin Calcium 40 Mg Tablet 1 Tab PO DAILY


Omeprazole 40 Mg Capsule.dr 1 Cap PO DAILY


Aspirin 81 Mg Tab.chew 1 Tab PO DAILY





ROS:


Review of Systems


Review of System


Unless noted in HPI 14 point review of systems is





Physical Exam:


Vital Signs:





Vital Signs








  Date Time  Temp Pulse Resp B/P (MAP) Pulse Ox O2 Delivery O2 Flow Rate FiO2


 


3/12/22 11:25 98.0 57 20 105/63 (77) 98 Room Air  





 98.0       








Physcial Exam:


GEN:   No apparent distress.  Alert and oriented


HEENT:   Normal cephalic, atraumatic, external auditory canals are patent


EYES:   Extraocular muscles are intact, pupil are equally round and reactive to 

light and accommodation


MUSCULOSKELETAL:  Well developed , well nourished, good range of motion


ENDOCRINE:   No thyromegaly was palpated


LYMPHATICS:   No cervical chain or axillary nodes were noted


HEMATOPOIETIC:  No bruising


NECK:   Supple, no JVD, no thyromegaly was noted


LUNGS:   Clear to auscultation in all lung fields without rhonchi or wheezing


HEART:    RRR, S!, S2 present.  Peripheral pulses intact, no obvious murmurs 

noted


ABDOMEN:   Soft, nontender.  Positive bowel sounds, no organomegaly, normal bow

el sounds


EXTREMITIES:   Without clubbing, cyanosis, or edema.  Pedal pulses intact.  

Negative Homans sign


NEUROLOGIC:   Normal speech and tone.  A&O x 3, moves all extremities, no 

obvious focal deficits


PSYCHIATRIC:   Normal affect, normal mood. Stable


SKIN:   No ulcerations or rashes, good skin turgor, no jaundice


VASCULAR:   Good capillary refill, neurovascular bundle appears to be intact





Labs:


Labs:





Laboratory Tests








Test


 3/11/22


21:20


 


White Blood Count


 9.7 x10^3/uL


(4.0-11.0)


 


Red Blood Count


 4.21 x10^6/uL


(3.50-5.40)


 


Hemoglobin


 12.6 g/dL


(12.0-15.5)


 


Hematocrit


 37.6 %


(36.0-47.0)


 


Mean Corpuscular Volume 89 fL () 


 


Mean Corpuscular Hemoglobin 30 pg (25-35) 


 


Mean Corpuscular Hemoglobin


Concent 34 g/dL


(31-37)


 


Red Cell Distribution Width


 13.1 %


(11.5-14.5)


 


Platelet Count


 219 x10^3/uL


(140-400)


 


Neutrophils (%) (Auto) 78 % (31-73) 


 


Lymphocytes (%) (Auto) 14 % (24-48) 


 


Monocytes (%) (Auto) 6 % (0-9) 


 


Eosinophils (%) (Auto) 1 % (0-3) 


 


Basophils (%) (Auto) 1 % (0-3) 


 


Neutrophils # (Auto)


 7.5 x10^3/uL


(1.8-7.7)


 


Lymphocytes # (Auto)


 1.4 x10^3/uL


(1.0-4.8)


 


Monocytes # (Auto)


 0.6 x10^3/uL


(0.0-1.1)


 


Eosinophils # (Auto)


 0.1 x10^3/uL


(0.0-0.7)


 


Basophils # (Auto)


 0.1 x10^3/uL


(0.0-0.2)


 


Sodium Level


 140 mmol/L


(136-145)


 


Potassium Level


 3.9 mmol/L


(3.5-5.1)


 


Chloride Level


 105 mmol/L


()


 


Carbon Dioxide Level


 25 mmol/L


(21-32)


 


Anion Gap 10 (6-14) 


 


Blood Urea Nitrogen


 14 mg/dL


(7-20)


 


Creatinine


 0.9 mg/dL


(0.6-1.0)


 


Estimated GFR


(Cockcroft-Gault) 68.3 





 


BUN/Creatinine Ratio 16 (6-20) 


 


Glucose Level


 103 mg/dL


(70-99)


 


Calcium Level


 8.5 mg/dL


(8.5-10.1)


 


Total Bilirubin


 0.1 mg/dL


(0.2-1.0)


 


Aspartate Amino Transf


(AST/SGOT) 14 U/L (15-37) 





 


Alanine Aminotransferase


(ALT/SGPT) 35 U/L (14-59) 





 


Alkaline Phosphatase


 101 U/L


()


 


Total Protein


 7.2 g/dL


(6.4-8.2)


 


Albumin


 3.4 g/dL


(3.4-5.0)


 


Albumin/Globulin Ratio 0.9 (1.0-1.7) 











Assessment/Plan


Assessment/Plan


Post LP headache





-LP 2 days ago has worsening headache after; currently undergoing work-up for 

MS.


-Hemodynamically stable


-Anesthesia eval for blood patch.


-Symptomatic treatment


-Discussed with bedside RN





Justifications for Admission


Other Justification














DU DURANT MD         Mar 13, 2022 17:45

## 2022-03-14 LAB
IGG SERPL-MCNC: 1017 MG/DL (ref 586–1602)
IGG/ALB CLEAR SER+CSF-RTO: 0.5 (ref 0–0.7)